# Patient Record
Sex: FEMALE | Race: WHITE | NOT HISPANIC OR LATINO
[De-identification: names, ages, dates, MRNs, and addresses within clinical notes are randomized per-mention and may not be internally consistent; named-entity substitution may affect disease eponyms.]

---
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
## 2021-07-22 ENCOUNTER — NON-APPOINTMENT (OUTPATIENT)
Age: 21
End: 2021-07-22

## 2021-08-09 ENCOUNTER — TRANSCRIPTION ENCOUNTER (OUTPATIENT)
Age: 21
End: 2021-08-09

## 2021-08-09 ENCOUNTER — NON-APPOINTMENT (OUTPATIENT)
Age: 21
End: 2021-08-09

## 2021-08-09 ENCOUNTER — APPOINTMENT (OUTPATIENT)
Dept: OTOLARYNGOLOGY | Facility: CLINIC | Age: 21
End: 2021-08-09
Payer: COMMERCIAL

## 2021-08-09 VITALS
SYSTOLIC BLOOD PRESSURE: 112 MMHG | WEIGHT: 148 LBS | DIASTOLIC BLOOD PRESSURE: 68 MMHG | HEART RATE: 74 BPM | HEIGHT: 64 IN | TEMPERATURE: 97.6 F | BODY MASS INDEX: 25.27 KG/M2

## 2021-08-09 DIAGNOSIS — Z78.9 OTHER SPECIFIED HEALTH STATUS: ICD-10-CM

## 2021-08-09 PROCEDURE — 31231 NASAL ENDOSCOPY DX: CPT

## 2021-08-09 PROCEDURE — 95004 PERQ TESTS W/ALRGNC XTRCS: CPT

## 2021-08-09 PROCEDURE — 99204 OFFICE O/P NEW MOD 45 MIN: CPT | Mod: 25

## 2021-08-09 RX ORDER — FLUTICASONE PROPIONATE 50 UG/1
50 SPRAY, METERED NASAL
Qty: 16 | Refills: 0 | Status: ACTIVE | COMMUNITY
Start: 2021-05-05

## 2021-08-09 RX ORDER — CEFDINIR 300 MG/1
300 CAPSULE ORAL
Qty: 20 | Refills: 0 | Status: ACTIVE | COMMUNITY
Start: 2021-07-21

## 2021-08-09 RX ORDER — AMOXICILLIN 875 MG/1
875 TABLET, FILM COATED ORAL
Qty: 20 | Refills: 0 | Status: ACTIVE | COMMUNITY
Start: 2021-05-05

## 2021-08-09 RX ORDER — NORETHINDRONE ACETATE AND ETHINYL ESTRADIOL, ETHINYL ESTRADIOL AND FERROUS FUMARATE 1MG-10(24)
1 MG-10 MCG / KIT ORAL
Qty: 84 | Refills: 0 | Status: ACTIVE | COMMUNITY
Start: 2021-08-06

## 2021-08-09 NOTE — CONSULT LETTER
[Dear  ___] : Dear  [unfilled], [Consult Letter:] : I had the pleasure of evaluating your patient, [unfilled]. [Please see my note below.] : Please see my note below. [Consult Closing:] : Thank you very much for allowing me to participate in the care of this patient.  If you have any questions, please do not hesitate to contact me. [Sincerely,] : Sincerely, [FreeTextEntry3] : Brendon Romero MD\par [facility]\par Otolaryngology and Facial Plastics\par [employer], [employer]\par

## 2021-08-09 NOTE — HISTORY OF PRESENT ILLNESS
[de-identified] : PAtient gets frequent sinus infections throughout the whole year. She states that when she has a sinus infection she gets severe nasal congestion bilaterally, loses her sense of smell and taste, and has moderate to severe facial pressure in the cheeks. She saw her PCP and was given antibitoics but felt like the pressure and sinsu issues returned as soon as she stopped the medications. SHe thinks she has had about 5 sinus infections in the last year. She also states that with this last sinus infection she had fluid in the ear and she always gets eye pressure with the infections. She ahs never been allergy tested before

## 2021-08-09 NOTE — REVIEW OF SYSTEMS
[Ear Pain] : ear pain [Nasal Congestion] : nasal congestion [Recurrent Sinus Infections] : recurrent sinus infections [Sinus Pain] : sinus pain [Sinus Pressure] : sinus pressure [Negative] : Heme/Lymph

## 2021-08-09 NOTE — END OF VISIT
[FreeTextEntry3] : I saw and examined this patient in person. I have discussed with Marry Narayanan, Physician Assistant, in detail the above note and agree with the above assessment and plan of care.\par

## 2021-08-09 NOTE — ASSESSMENT
[FreeTextEntry1] : With continued episodes of what appear to be consistent sinus infections.  Or recurrent sinus infections.  Usually associated with facial pain and pressure her last infection was in July treated with antibiotics she is going up to [place] for this semester on examination endoscopically she has a mildly deviated septum no tumors masses or polyps sent her for a CAT scan to definitively evaluate her sinuses before she goes up to [place] to help manage her symptoms moving forward we did allergy testing as well.

## 2021-08-14 ENCOUNTER — APPOINTMENT (OUTPATIENT)
Dept: CT IMAGING | Facility: CLINIC | Age: 21
End: 2021-08-14
Payer: COMMERCIAL

## 2021-08-14 ENCOUNTER — OUTPATIENT (OUTPATIENT)
Dept: OUTPATIENT SERVICES | Facility: HOSPITAL | Age: 21
LOS: 1 days | End: 2021-08-14
Payer: COMMERCIAL

## 2021-08-14 DIAGNOSIS — J32.0 CHRONIC MAXILLARY SINUSITIS: ICD-10-CM

## 2021-08-14 PROCEDURE — 70486 CT MAXILLOFACIAL W/O DYE: CPT | Mod: 26

## 2021-08-14 PROCEDURE — 70486 CT MAXILLOFACIAL W/O DYE: CPT

## 2021-08-18 ENCOUNTER — NON-APPOINTMENT (OUTPATIENT)
Age: 21
End: 2021-08-18

## 2023-05-25 ENCOUNTER — APPOINTMENT (OUTPATIENT)
Dept: OTOLARYNGOLOGY | Facility: CLINIC | Age: 23
End: 2023-05-25
Payer: COMMERCIAL

## 2023-05-25 VITALS
HEART RATE: 72 BPM | DIASTOLIC BLOOD PRESSURE: 74 MMHG | BODY MASS INDEX: 26.29 KG/M2 | SYSTOLIC BLOOD PRESSURE: 111 MMHG | WEIGHT: 154 LBS | TEMPERATURE: 98.1 F | HEIGHT: 64 IN

## 2023-05-25 DIAGNOSIS — J34.2 DEVIATED NASAL SEPTUM: ICD-10-CM

## 2023-05-25 DIAGNOSIS — R09.81 NASAL CONGESTION: ICD-10-CM

## 2023-05-25 DIAGNOSIS — J32.0 CHRONIC MAXILLARY SINUSITIS: ICD-10-CM

## 2023-05-25 PROCEDURE — 99214 OFFICE O/P EST MOD 30 MIN: CPT | Mod: 25

## 2023-05-25 PROCEDURE — 31231 NASAL ENDOSCOPY DX: CPT

## 2023-05-25 RX ORDER — FLUTICASONE PROPIONATE 50 UG/1
50 SPRAY, METERED NASAL DAILY
Qty: 3 | Refills: 3 | Status: ACTIVE | COMMUNITY
Start: 2023-05-25 | End: 1900-01-01

## 2023-05-25 NOTE — HISTORY OF PRESENT ILLNESS
[de-identified] : Patient last seen two years ago, returns for recurrent sinus issues recently. She states that whenever she gets a sinus infection she has right ear pressure and right nasal congestion. She thinks that the last sinus infection was a few months ago and she was given antibiotics. She had a recent CT for a concussion that suggested a cyst in the sinus on the right side. \par She was allergy tested in 2021 and has an allergyt o cats, and has a cat at home. She has nto been using any nasal sprays recently. \par

## 2023-05-25 NOTE — ASSESSMENT
[FreeTextEntry1] : She just graduated from [place] was had a concussion was seen in the emergency room and a CAT scan that showed evidence of a small cyst in her right maxillary sinus we did a CAT scan on her several years ago for same cyst same size of no concern she does complain of difficulty breathing through her nose endoscopically she has a deviated septum she knows she is allergic to cats unfortunately she does have a cat we have encouraged her to use Flonase nasal spray as well as Allegra now that she is back home with her cat and if her breathing is not significantly improved consideration for septoplasty should be given.

## 2023-05-25 NOTE — END OF VISIT
Addended Spencer Helm on: 11/21/2022 05:59 PM     Modules accepted: Orders [FreeTextEntry3] : I, Dr. Romero personally performed the evaluation and management (E/M) services including all necessary procedures, for this new patient. That E/M includes conducting the clinically appropriate initial history &/or exam, assessing all conditions, and establishing the plan of care. Today, my ERVIN, Marry Narayanan, was here to observe &/or participate in the visit & follow plan of care established by me.\par \par \par

## 2023-05-25 NOTE — REVIEW OF SYSTEMS
[As Noted in HPI] : as noted in HPI [Nasal Congestion] : nasal congestion [Recurrent Sinus Infections] : recurrent sinus infections [Negative] : Heme/Lymph [Sinus Pressure] : sinus pressure

## 2023-07-27 ENCOUNTER — APPOINTMENT (OUTPATIENT)
Dept: OTOLARYNGOLOGY | Facility: CLINIC | Age: 23
End: 2023-07-27